# Patient Record
Sex: FEMALE | Race: WHITE | URBAN - METROPOLITAN AREA
[De-identification: names, ages, dates, MRNs, and addresses within clinical notes are randomized per-mention and may not be internally consistent; named-entity substitution may affect disease eponyms.]

---

## 2018-10-24 ENCOUNTER — OFFICE VISIT (OUTPATIENT)
Dept: DERMATOLOGY | Facility: CLINIC | Age: 28
End: 2018-10-24
Payer: COMMERCIAL

## 2018-10-24 DIAGNOSIS — L63.9 AA (ALOPECIA AREATA): Primary | ICD-10-CM

## 2018-10-24 ASSESSMENT — PAIN SCALES - GENERAL
PAINLEVEL: MILD PAIN (2)
PAINLEVEL: NO PAIN (0)

## 2018-10-24 NOTE — LETTER
Date:October 26, 2018      Patient was self referred, no letter generated. Do not send.        [facility] Health Information

## 2018-10-24 NOTE — PROGRESS NOTES
[facility] Dermatology Note      Dermatology Problem List:  1. Alopecia Areata - ILK injections x3 in 2016, lesion resolved. New lesion 06/2018. ILK performed 10/24 and will continue monthly     Encounter Date: Oct 24, 2018    CC:   Chief Complaint   Patient presents with     Derm Problem     Nae is here for inejections        History of Present Illness:  Ms. Nae Caro is a 28 year old female who presents for evaluation of alopecia areata. Has had one prior episode in 2016. Had one lesion at that time and was seen by Dr. Dooley at [facility]. She had ILK injections x3 and began to have hair growth, so injections were discontinued. Has noticed a new lesion on her left occiput for about 4 months. She has noticed some fine hairs present for a couple months, but no further growth. No hair loss of her eyebrows or eyelashes. Using salicylic acid shampoo and head and shoulders.     Past Medical History:   There is no problem list on file for this patient.    Past Medical History:   Diagnosis Date     Acid reflux disease      Past Surgical History:   Procedure Laterality Date     none         Social History:  Patient  reports that she quit smoking about 2 years ago. She has never used smokeless tobacco. She reports that she drinks alcohol. She reports that she does not use illicit drugs.    Family History:  Family History   Problem Relation Age of Onset     Breast Cancer Maternal Grandfather      Melanoma No family hx of      Skin Cancer No family hx of        Medications:  Current Outpatient Prescriptions   Medication Sig Dispense Refill     LANSOPRAZOLE PO        levonorgestrel (MIRENA) 20 MCG/24HR IUD 1 each by Intrauterine route once       ONDANSETRON HCL        ranitidine (ZANTAC) 150 MG tablet Take by mouth 2 times daily       SPIRONOLACTONE PO        Sulfacetamide-Sulfur-Cleanser (SULFACET-SULFUR WASH &CLEANSER EX) Externally apply topically as needed          No Known  Allergies      Review of Systems:  -Skin/Heme New Pt: The patient denies frequent sun exposure. The patient denies excessive scarring or problems healing except as per HPI. The patient denies excessive bleeding.  -Constitutional: The patient denies fatigue, fevers, chills, unintended weight loss, and night sweats.  -HEENT: Patient denies nonhealing oral sores.  -Skin: As above in HPI. No additional skin concerns.    Physical exam:  Vitals: There were no vitals taken for this visit.  GEN: This is a well developed, well-nourished female in no acute distress, in a pleasant mood.    SKIN: Focused examination of the scalp was performed.  -Complete hair loss in circular lesion, 3 x 2.5 cm on left occipital area of scalp. There are white vellus hair fibers over entire lesion.   - The eyebrows, eyelashes, and nails are within normal limits. No pitting or onycholysis.   -No other lesions of concern on areas examined.     Impression/Plan:  1. Alopecia areata - Consistent with alopecia areata with new lesion present on the left occipital region. Previously responded well to ILK injections (needed 3 with prior episode), therefore patient is requesting we perform this procedure today     Kenalog intralesional injection procedure note: After verbal consent and discussion of risks including but not limited to atrophy, pain, and bruising, time out was performed, the patient underwent positioning and the area was prepped with isopropyl alcohol, 1.5 total cc of Kenalog 10 mg/cc was injected into 1 site(s) on the lft occipital scalp.  The patient tolerated the procedure well and left the Dermatology clinic in good condition.      Follow up in 1 month for repeat injection.       CC Dr. Dooley on close of this encounter.  Follow-up in 1 months, earlier for new or changing lesions.       Staff Physician Comments:  I saw and evaluated the patient with the resident and I agree with the assessment and plan as documented in the resident's  note. I personally performed the IL injection of Triamcinolone.    Juan Cary MD  Professor  Head of Dermato-Allergy Division  Department of Dermatology  [facility], [place], [place]    Staff Involved:  Resident(Dot Dixon)/Staff(as above)

## 2018-10-24 NOTE — MR AVS SNAPSHOT
After Visit Summary   10/24/2018    Nae Caro    MRN: [id number]           Patient Information     Date Of Birth          1990        Visit Information        Provider Department      10/24/2018 1:45 PM Juan Cray MD [facility] Dermatology        Today's Diagnoses     AA (alopecia areata)    -  1       Follow-ups after your visit        Your next 10 appointments already scheduled     2018 12:00 PM CST   (Arrive by 11:45 AM)   Return Visit with MD CLINT [facility] Dermatology ([facility] [place])    [address] [place] [address] [phone]              Who to contact     Please call your clinic at [phone] to:    Ask questions about your health    Make or cancel appointments    Discuss your medicines    Learn about your test results    Speak to your doctor            Additional Information About Your Visit        MyChart Information     CyOpticst is an electronic gateway that provides easy, online access to your medical records. With WebAction, you can request a clinic appointment, read your test results, renew a prescription or communicate with your care team.     To sign up for CyOpticst visit the website at [email or web address]   You will be asked to enter the access code listed below, as well as some personal information. Please follow the directions to create your username and password.     Your access code is: [email or web address]  Expires: 2019  7:25 PM     Your access code will  in 90 days. If you need help or a new code, please contact your [facility] or call [phone] for assistance.        Care EveryWhere ID     This is your Care EveryWhere ID. This could be used by other organizations to access your [place] medical records  [id number]         Blood Pressure from Last 3 Encounters:   10/06/16 119/76   10/31/11 125/75    Weight from Last 3 Encounters:   10/06/16 63 kg  (139 lb)              Today, you had the following     No orders found for display       Primary Care Provider Office Phone # Fax #    [facility] [phone]       [address] [place] [address]        Equal Access to Services     EVANGELISTA MART : Hadii aad ku hadbriannao Sothadali, waaxda luqadaha, qaybta kaalmada adewerneryada, andrew miguel angelin hayaan robertawerner villatoro elda hernandez. So [facility] [phone].    ATENCIÓN: Si habla español, tiene a owens disposición servicios gratuitos de asistencia lingüística. Llame al [phone].    We comply with applicable federal civil rights laws and [place] laws. We do not discriminate on the basis of race, color, national origin, age, disability, sex, sexual orientation, or gender identity.            Thank you!     Thank you for choosing [facility] DERMATOLOGY  for your care. Our goal is always to provide you with excellent care. Hearing back from our patients is one way we can continue to improve our services. Please take a few minutes to complete the written survey that you may receive in the mail after your visit with us. Thank you!             Your Updated Medication List - Protect others around you: Learn how to safely use, store and throw away your medicines at www.disposemymeds.org.          This list is accurate as of 10/24/18 11:59 PM.  Always use your most recent med list.                   Brand Name Dispense Instructions for use Diagnosis    LANSOPRAZOLE PO           levonorgestrel 20 MCG/24HR IUD    MIRENA     1 each by Intrauterine route once        ONDANSETRON HCL           ranitidine 150 MG tablet    ZANTAC     Take by mouth 2 times daily        SPIRONOLACTONE PO           SULFACET-SULFUR WASH &CLEANSER EX      Externally apply topically as needed

## 2018-10-24 NOTE — LETTER
10/24/2018       RE: Nae Caro  [address]  [facility] [address]     Dear Colleague,    Thank you for referring your patient, Nae Caro, to the [facility] DERMATOLOGY at [facility]. Please see a copy of my visit note below.    [facility] Dermatology Note      Dermatology Problem List:  1. Alopecia Areata - ILK injections x3 in 2016, lesion resolved. New lesion 06/2018. ILK performed 10/24 and will continue monthly     Encounter Date: Oct 24, 2018    CC:   Chief Complaint   Patient presents with     Derm Problem     Nae is here for inejections        History of Present Illness:  Ms. Nae Caro is a 28 year old female who presents for evaluation of alopecia areata. Has had one prior episode in 2016. Had one lesion at that time and was seen by Dr. Dooley at [facility]. She had ILK injections x3 and began to have hair growth, so injections were discontinued. Has noticed a new lesion on her left occiput for about 4 months. She has noticed some fine hairs present for a couple months, but no further growth. No hair loss of her eyebrows or eyelashes. Using salicylic acid shampoo and head and shoulders.     Past Medical History:   There is no problem list on file for this patient.    Past Medical History:   Diagnosis Date     Acid reflux disease      Past Surgical History:   Procedure Laterality Date     none         Social History:  Patient  reports that she quit smoking about 2 years ago. She has never used smokeless tobacco. She reports that she drinks alcohol. She reports that she does not use illicit drugs.    Family History:  Family History   Problem Relation Age of Onset     Breast Cancer Maternal Grandfather      Melanoma No family hx of      Skin Cancer No family hx of        Medications:  Current Outpatient Prescriptions   Medication Sig Dispense Refill     LANSOPRAZOLE PO        levonorgestrel (MIRENA) 20 MCG/24HR IUD 1 each by Intrauterine  route once       ONDANSETRON HCL        ranitidine (ZANTAC) 150 MG tablet Take by mouth 2 times daily       SPIRONOLACTONE PO        Sulfacetamide-Sulfur-Cleanser (SULFACET-SULFUR WASH &CLEANSER EX) Externally apply topically as needed          No Known Allergies      Review of Systems:  -Skin/Heme New Pt: The patient denies frequent sun exposure. The patient denies excessive scarring or problems healing except as per HPI. The patient denies excessive bleeding.  -Constitutional: The patient denies fatigue, fevers, chills, unintended weight loss, and night sweats.  -HEENT: Patient denies nonhealing oral sores.  -Skin: As above in HPI. No additional skin concerns.    Physical exam:  Vitals: There were no vitals taken for this visit.  GEN: This is a well developed, well-nourished female in no acute distress, in a pleasant mood.    SKIN: Focused examination of the scalp was performed.  -Complete hair loss in circular lesion, 3 x 2.5 cm on left occipital area of scalp. There are white vellus hair fibers over entire lesion.   - The eyebrows, eyelashes, and nails are within normal limits. No pitting or onycholysis.   -No other lesions of concern on areas examined.     Impression/Plan:  1. Alopecia areata - Consistent with alopecia areata with new lesion present on the left occipital region. Previously responded well to ILK injections (needed 3 with prior episode), therefore patient is requesting we perform this procedure today     Kenalog intralesional injection procedure note: After verbal consent and discussion of risks including but not limited to atrophy, pain, and bruising, time out was performed, the patient underwent positioning and the area was prepped with isopropyl alcohol, 1.5 total cc of Kenalog 10 mg/cc was injected into 1 site(s) on the lft occipital scalp.  The patient tolerated the procedure well and left the Dermatology clinic in good condition.      Follow up in 1 month for repeat injection.       CC   Soumya on close of this encounter.  Follow-up in 1 months, earlier for new or changing lesions.       Staff Physician Comments:  I saw and evaluated the patient with the resident and I agree with the assessment and plan as documented in the resident's note. I personally performed the IL injection of Triamcinolone.    Juan Cary MD  Professor  Head of Dermato-Allergy Division  Department of Dermatology  [facility]    Staff Involved:  Resident(Dot Dixon)/Staff(as above)      Again, thank you for allowing me to participate in the care of your patient.      Sincerely,    Juan Cary MD

## 2018-10-24 NOTE — NURSING NOTE
Dermatology Rooming Note    Nae Caro's goals for this visit include:   Chief Complaint   Patient presents with     Derm Problem     Nae is here for inejections      Anna Goldberg

## 2018-10-24 NOTE — NURSING NOTE
Drug Administration Record    Drug Name: triamcinolone acetonide(kenalog)  Dose: 1.5mL of triamcinolone 10mg/mL, 15mg dose  Route administered: ID  NDC #: Kenalog-10 (0266-9604-16)  Amount of waste(mL):3.5  Reason for waste: Single use vial    LOT #: [id number]  SITE: [facility]  : [employer]  EXPIRATION DATE: 4/2020

## 2018-11-19 ENCOUNTER — OFFICE VISIT (OUTPATIENT)
Dept: DERMATOLOGY | Facility: CLINIC | Age: 28
End: 2018-11-19
Payer: COMMERCIAL

## 2018-11-19 DIAGNOSIS — L21.9 DERMATITIS, SEBORRHEIC: Primary | ICD-10-CM

## 2018-11-19 DIAGNOSIS — L63.9 ALOPECIA AREATA: ICD-10-CM

## 2018-11-19 RX ORDER — KETOCONAZOLE 20 MG/ML
SHAMPOO TOPICAL PRN
Qty: 120 ML | Refills: 3 | Status: SHIPPED | OUTPATIENT
Start: 2018-11-19 | End: 2019-03-19

## 2018-11-19 RX ORDER — CLOBETASOL PROPIONATE 0.05 G/100ML
SHAMPOO TOPICAL PRN
Qty: 118 ML | Refills: 1 | Status: SHIPPED | OUTPATIENT
Start: 2018-11-19 | End: 2019-01-18

## 2018-11-19 ASSESSMENT — PAIN SCALES - GENERAL: PAINLEVEL: NO PAIN (0)

## 2018-11-19 NOTE — PROGRESS NOTES
[facility] Dermatology Note      Dermatology Problem List:  1. Alopecia Areata - ILK injections x3 in 2016, lesion resolved. New lesion 06/2018. ILK performed 10/24 and will continue monthly     Encounter Date: Nov 19, 2018    CC:   Chief Complaint   Patient presents with     Hair Loss     Nae is here for Injections alopecia.       History of Present Illness:  Ms. Nae Caro is a 28 year old female who presents for evaluation of alopecia areata. Has had one prior episode in 2016. Had one lesion at that time and was seen by Dr. Dooley at [facility]. She had ILK injections x3 and began to have hair growth, so injections were discontinued. Has noticed a new lesion on her left occiput for about 4 months. She has noticed some fine hairs present for a couple months, but no further growth. No hair loss of her eyebrows or eyelashes. Using salicylic acid shampoo and head and shoulders.     Past Medical History:   There is no problem list on file for this patient.    Past Medical History:   Diagnosis Date     Acid reflux disease      Past Surgical History:   Procedure Laterality Date     none         Social History:  Patient  reports that she quit smoking about 2 years ago. She has never used smokeless tobacco. She reports that she drinks alcohol. She reports that she does not use illicit drugs.    Family History:  Family History   Problem Relation Age of Onset     Breast Cancer Maternal Grandfather      Melanoma No family hx of      Skin Cancer No family hx of        Medications:  Current Outpatient Prescriptions   Medication Sig Dispense Refill     LANSOPRAZOLE PO        levonorgestrel (MIRENA) 20 MCG/24HR IUD 1 each by Intrauterine route once       ONDANSETRON HCL        ranitidine (ZANTAC) 150 MG tablet Take by mouth 2 times daily       SPIRONOLACTONE PO        Sulfacetamide-Sulfur-Cleanser (SULFACET-SULFUR WASH &CLEANSER EX) Externally apply topically as needed          No Known  Allergies      Review of Systems:  -Skin/Heme New Pt: The patient denies frequent sun exposure. The patient denies excessive scarring or problems healing except as per HPI. The patient denies excessive bleeding.  -Constitutional: The patient denies fatigue, fevers, chills, unintended weight loss, and night sweats.  -HEENT: Patient denies nonhealing oral sores.  -Skin: As above in HPI. No additional skin concerns.    Physical exam:  Vitals: There were no vitals taken for this visit.  GEN: This is a well developed, well-nourished female in no acute distress, in a pleasant mood.    SKIN: Focused examination of the scalp was performed.  - regrowth of hair in the Alopecia areata area. Only a few fields without hair, but the rest has about 1cm white, fine hair (vellus)   - seborrhoea on scalp skin and desquamation retroauricular and frontal scalp that could be just seborrhoic dermatitis, but as well initial psoriasis.   - The eyebrows, eyelashes, and nails are within normal limits. No pitting or onycholysis.   -No other lesions of concern on areas examined.     Impression/Plan:  1. Alopecia areata - Consistent with alopecia areata with new lesion present on the left occipital region. Previously responded well to ILK injections (needed 3 with prior episode), therefore patient is requesting we perform this procedure today     Kenalog intralesional injection procedure note: After verbal consent and discussion of risks including but not limited to atrophy, pain, and bruising, time out was performed, the patient underwent positioning and the area was prepped with isopropyl alcohol, 0.5cc total cc of Kenalog 10 mg/cc was injected into 1 site(s) on the lft occipital scalp.  The patient tolerated the procedure well and left the Dermatology clinic in good condition.    If necessary follow up in 1 month for repeat injection.     2. Seborrhoic dermatitis on scalp (DDx psoriasis)    Ketoconazole shampoo alternating with Clobex  Shampoo

## 2018-11-19 NOTE — MR AVS SNAPSHOT
After Visit Summary   2018    Nae Caro    MRN: [id number]           Patient Information     Date Of Birth          1990        Visit Information        Provider Department      2018 12:00 PM Juan Cary MD [facility] Dermatology        Today's Diagnoses     Dermatitis, seborrheic    -  1    Alopecia areata           Follow-ups after your visit        Who to contact     Please call your clinic at [phone] to:    Ask questions about your health    Make or cancel appointments    Discuss your medicines    Learn about your test results    Speak to your doctor            Additional Information About Your Visit        MyChart Information     Paybook is an electronic gateway that provides easy, online access to your medical records. With Paybook, you can request a clinic appointment, read your test results, renew a prescription or communicate with your care team.     To sign up for e-Zassit visit the website at [email or web address]   You will be asked to enter the access code listed below, as well as some personal information. Please follow the directions to create your username and password.     Your access code is: [email or web address]  Expires: 2019  6:25 PM     Your access code will  in 90 days. If you need help or a new code, please contact your [facility] or call [phone] for assistance.        Care EveryWhere ID     This is your Care EveryWhere ID. This could be used by other organizations to access your [place] medical records  [id number]         Blood Pressure from Last 3 Encounters:   10/06/16 119/76   10/31/11 125/75    Weight from Last 3 Encounters:   10/06/16 63 kg (139 lb)              Today, you had the following     No orders found for display         Today's Medication Changes          These changes are accurate as of 18  7:41 PM.  If you have any questions, ask your nurse or doctor.               Start taking  these medicines.        Dose/Directions    clobetasol propionate 0.05 % shampoo   Used for:  Dermatitis, seborrheic   Started by:  Juan Cary MD        Apply topically as needed (use alternating with Ketoconazole Shapooe)   Quantity:  118 mL   Refills:  1       ketoconazole 2 % shampoo   Commonly known as:  NIZORAL   Used for:  Dermatitis, seborrheic   Started by:  Juan Cary MD        Apply topically as needed for itching or irritation (use alternating with Clobex Sh)   Quantity:  120 mL   Refills:  3            Where to get your medicines      These medications were sent to [facility] [id number] [place] [address] [address] [facility] [address] AT [address] [address]     Phone:  [phone]     clobetasol propionate 0.05 % shampoo    ketoconazole 2 % shampoo                Primary Care Provider Office Phone # Fax #    [facility] [phone]       [address] [place] [address]        Equal Access to Services     [facility]: Hadii israel ku hadasho Soomaali, waaxda luqadaha, qaybta kaalmada adeegyada, andrew schroeder . So [facility] [phone].    ATENCIÓN: Si habla español, tiene a owens disposición servicios gratuitos de asistencia lingüística. Llame al [phone].    We comply with applicable federal civil rights laws and [place] laws. We do not discriminate on the basis of race, color, national origin, age, disability, sex, sexual orientation, or gender identity.            Thank you!     Thank you for choosing [facility] DERMATOLOGY  for your care. Our goal is always to provide you with excellent care. Hearing back from our patients is one way we can continue to improve our services. Please take a few minutes to complete the written survey that you may receive in the mail after your visit with us. Thank you!             Your Updated Medication List - Protect others around you: Learn how to safely use, store and  throw away your medicines at www.disposemymeds.org.          This list is accurate as of 11/19/18  7:41 PM.  Always use your most recent med list.                   Brand Name Dispense Instructions for use Diagnosis    clobetasol propionate 0.05 % shampoo     118 mL    Apply topically as needed (use alternating with Ketoconazole Shapooe)    Dermatitis, seborrheic       ketoconazole 2 % shampoo    NIZORAL    120 mL    Apply topically as needed for itching or irritation (use alternating with Clobex Sh)    Dermatitis, seborrheic       LANSOPRAZOLE PO           levonorgestrel 20 MCG/24HR IUD    MIRENA     1 each by Intrauterine route once        ONDANSETRON HCL           ranitidine 150 MG tablet    ZANTAC     Take by mouth 2 times daily        SPIRONOLACTONE PO           SULFACET-SULFUR WASH &CLEANSER EX      Externally apply topically as needed

## 2018-11-19 NOTE — LETTER
11/19/2018       RE: Nae Caro  [address]  [facility] [address]     Dear Colleague,    Thank you for referring your patient, Nae Caro, to the [facility]ER[facility] at [facility]. Please see a copy of my visit note below.    [facility] Dermatology Note      Dermatology Problem List:  1. Alopecia Areata - ILK injections x3 in 2016, lesion resolved. New lesion 06/2018. ILK performed 10/24 and will continue monthly     Encounter Date: Nov 19, 2018    CC:   Chief Complaint   Patient presents with     Hair Loss     Nae is here for Injections alopecia.       History of Present Illness:  Ms. Nae Caro is a 28 year old female who presents for evaluation of alopecia areata. Has had one prior episode in 2016. Had one lesion at that time and was seen by Dr. Dooley at [facility]. She had ILK injections x3 and began to have hair growth, so injections were discontinued. Has noticed a new lesion on her left occiput for about 4 months. She has noticed some fine hairs present for a couple months, but no further growth. No hair loss of her eyebrows or eyelashes. Using salicylic acid shampoo and head and shoulders.     Past Medical History:   There is no problem list on file for this patient.    Past Medical History:   Diagnosis Date     Acid reflux disease      Past Surgical History:   Procedure Laterality Date     none         Social History:  Patient  reports that she quit smoking about 2 years ago. She has never used smokeless tobacco. She reports that she drinks alcohol. She reports that she does not use illicit drugs.    Family History:  Family History   Problem Relation Age of Onset     Breast Cancer Maternal Grandfather      Melanoma No family hx of      Skin Cancer No family hx of        Medications:  Current Outpatient Prescriptions   Medication Sig Dispense Refill     LANSOPRAZOLE PO        levonorgestrel (MIRENA) 20 MCG/24HR IUD 1 each by  Intrauterine route once       ONDANSETRON HCL        ranitidine (ZANTAC) 150 MG tablet Take by mouth 2 times daily       SPIRONOLACTONE PO        Sulfacetamide-Sulfur-Cleanser (SULFACET-SULFUR WASH &CLEANSER EX) Externally apply topically as needed          No Known Allergies      Review of Systems:  -Skin/Heme New Pt: The patient denies frequent sun exposure. The patient denies excessive scarring or problems healing except as per HPI. The patient denies excessive bleeding.  -Constitutional: The patient denies fatigue, fevers, chills, unintended weight loss, and night sweats.  -HEENT: Patient denies nonhealing oral sores.  -Skin: As above in HPI. No additional skin concerns.    Physical exam:  Vitals: There were no vitals taken for this visit.  GEN: This is a well developed, well-nourished female in no acute distress, in a pleasant mood.    SKIN: Focused examination of the scalp was performed.  - regrowth of hair in the Alopecia areata area. Only a few fields without hair, but the rest has about 1cm white, fine hair (vellus)   - seborrhoea on scalp skin and desquamation retroauricular and frontal scalp that could be just seborrhoic dermatitis, but as well initial psoriasis.   - The eyebrows, eyelashes, and nails are within normal limits. No pitting or onycholysis.   -No other lesions of concern on areas examined.     Impression/Plan:  1. Alopecia areata - Consistent with alopecia areata with new lesion present on the left occipital region. Previously responded well to ILK injections (needed 3 with prior episode), therefore patient is requesting we perform this procedure today     Kenalog intralesional injection procedure note: After verbal consent and discussion of risks including but not limited to atrophy, pain, and bruising, time out was performed, the patient underwent positioning and the area was prepped with isopropyl alcohol, 0.5cc total cc of Kenalog 10 mg/cc was injected into 1 site(s) on the lft occipital  scalp.  The patient tolerated the procedure well and left the Dermatology clinic in good condition.    If necessary follow up in 1 month for repeat injection.     2. Seborrhoic dermatitis on scalp (DDx psoriasis)    Ketoconazole shampoo alternating with Clobex Shampoo      Again, thank you for allowing me to participate in the care of your patient.      Sincerely,    Juan Cary MD

## 2018-11-19 NOTE — NURSING NOTE
Dermatology Rooming Note    Nae Caro's goals for this visit include:   Chief Complaint   Patient presents with     Hair Loss     Nae is here for Injections alopecia.     Anna Goldberg, CMA

## 2018-11-19 NOTE — NURSING NOTE
Drug Administration Record    Drug Name: triamcinolone acetonide(kenalog)  Dose: 0.5mL of triamcinolone 10mg/mL, 5mg dose  Route administered: ID  NDC #: Kenalog-10 (9625-7020-78)  Amount of waste(mL):4.5  Reason for waste: multi dose used as single dose vial Single use vial    LOT #: [id number]  SITE: [facility]  : [employer]  EXPIRATION DATE: 04/2020